# Patient Record
Sex: MALE | Race: WHITE | NOT HISPANIC OR LATINO | URBAN - METROPOLITAN AREA
[De-identification: names, ages, dates, MRNs, and addresses within clinical notes are randomized per-mention and may not be internally consistent; named-entity substitution may affect disease eponyms.]

---

## 2018-04-18 ENCOUNTER — OFFICE VISIT CONVERTED (OUTPATIENT)
Dept: SURGERY | Facility: CLINIC | Age: 68
End: 2018-04-18
Attending: PHYSICIAN ASSISTANT

## 2018-05-09 ENCOUNTER — OFFICE VISIT CONVERTED (OUTPATIENT)
Dept: SURGERY | Facility: CLINIC | Age: 68
End: 2018-05-09
Attending: PHYSICIAN ASSISTANT

## 2019-05-20 ENCOUNTER — OFFICE VISIT CONVERTED (OUTPATIENT)
Dept: UROLOGY | Facility: CLINIC | Age: 69
End: 2019-05-20
Attending: UROLOGY

## 2019-05-20 ENCOUNTER — HOSPITAL ENCOUNTER (OUTPATIENT)
Dept: OTHER | Facility: HOSPITAL | Age: 69
Discharge: HOME | End: 2019-05-20
Attending: UROLOGY

## 2019-05-20 LAB — PSA SERPL-MCNC: 3.05 NG/ML (ref 0–4)

## 2020-05-12 ENCOUNTER — HOSPITAL ENCOUNTER (OUTPATIENT)
Dept: OTHER | Facility: HOSPITAL | Age: 70
Discharge: HOME | End: 2020-05-12
Attending: UROLOGY

## 2020-05-12 LAB — PSA SERPL-MCNC: 2.7 NG/ML (ref 0–4)

## 2020-05-18 ENCOUNTER — TELEPHONE CONVERTED (OUTPATIENT)
Dept: UROLOGY | Facility: CLINIC | Age: 70
End: 2020-05-18
Attending: UROLOGY

## 2021-05-13 NOTE — PROGRESS NOTES
"   Progress Note      Patient Name: Suleman Albert   Patient ID: [id number]   Sex: Male   YOB: 1950    Primary Care Provider: Kostas CARBAJAL   Referring Provider: Kostas CARBAJAL    Visit Date: May 18, 2020    Provider: Jon Mello MD   Location: [facility]   Location Address: [address] [place] [address]  [id number]   Location Phone: [phone]          Chief Complaint     BPH/ED       History Of Present Illness  Suleman Albert is a 69 year old /White male who is presenting for evaluation via telephone call. Verbal consent obtained before beginning visit.   The following staff were present during this visit: Lindy Grayinger      1 yr. Came on slowly.   He does feel like this helps him quite a bit.  He does not miss medication.  Some trouble with initiation of stream. Nocturia X  0.  Some frequency.  No incontinence.   Stable.      PVR    5/19  110    No gross hematuria or UTIs.    No caffeine. Mainly water.    ED ever since 2017.  Patient can get partial erections at times.  He does not have good sensation has a hard time with orgasm.  Patient never really has any ejaculation.    Not interested in Trimix/JEMMA.    Sildenafil -patient had a lot of trouble with sinusitis and cannot tolerate this medication    4/18 total testosterone 310.  Patient does have a history of being on testosterone replacement.  He had been off this for several months as he was having a lot of back issues and was not placed back on this by our PA because his levels were testing normal    Previous    history of nephrolithiasis  Patient has had ESWL  (done three times per patient) and Ureteroscopy with laser lithotripsy with stone extraction (done twice - by Dr. Sebastian)  Last procedure for stone removal - 2002      Patient has also previously been on TRT.  His last injection was in February.  His doctor retired.  HE was getting 600mg IM every month\">15 minutes was used on this telephone " call      68-year-old  gentleman here today for BPH and ED    weak stream.  on Flomax and finasteride for > 1 yr. Came on slowly.   He does feel like this helps him quite a bit.  He does not miss medication.  Some trouble with initiation of stream. Nocturia X  0.  Some frequency.  No incontinence.   Stable.      PVR    5/19  110    No gross hematuria or UTIs.    No caffeine. Mainly water.    ED ever since 2017.  Patient can get partial erections at times.  He does not have good sensation has a hard time with orgasm.  Patient never really has any ejaculation.    Not interested in Trimix/JEMMA.    Sildenafil -patient had a lot of trouble with sinusitis and cannot tolerate this medication    4/18 total testosterone 310.  Patient does have a history of being on testosterone replacement.  He had been off this for several months as he was having a lot of back issues and was not placed back on this by our PA because his levels were testing normal    Previous    history of nephrolithiasis  Patient has had ESWL  (done three times per patient) and Ureteroscopy with laser lithotripsy with stone extraction (done twice - by Dr. Sebastian)  Last procedure for stone removal - 2002      Patient has also previously been on TRT.  His last injection was in February.  His doctor retired.  HE was getting 600mg IM every month      PSA:     5/20        2.7  5/19        3.05  4/18/18:  3.67  12/02/16: 4.52  07/01/16: 3.56  5/31/16:  5.32      Prostate Biospy:  1/09/17:  Mild glandular prostatitis, diffuse and bilateral with glandular hypertrophy; focal basaloid hypertrophy; negative for malignancy    cystoscopy:  11/27/17:  grade 1 trabeculation       Past Medical History  Androgen deficiency; Benign enlargement of prostate; Erectile dysfunction; Fatigue; Frequency of micturition; HTN (hypertension); Hypercholesteremia; Nocturia; Obesity; Prostate cancer screening         Past Surgical History  Back surgery; Colonoscopy; EYE SURGERY          Medication List  cyclobenzaprine 10 mg oral tablet; finasteride 5 mg oral tablet; Fish Oil 1,000 mg (120 mg-180 mg) oral capsule; lisinopril 40 mg oral tablet; pravastatin 80 mg oral tablet; tamsulosin 0.4 mg oral capsule; triamterene-hydrochlorothiazid 37.5-25 mg oral capsule; Vitamin C 500 mg oral tablet; Vitamin D3 2,000 unit oral tablet; Zyrtec 10 mg oral tablet         Allergy List  OxyContin       Allergies Reconciled  Family Medical History  Heart Attack (MI); Lung cancer         Social History  Tobacco (Never)         Review of Systems  · Constitutional  o Denies  o : chills, fever  · Gastrointestinal  o Denies  o : nausea, vomiting              Assessment  · Benign enlargement of prostate     600.00/N40.0  · Erectile dysfunction     607.84/N52.9  · Frequency of micturition     788.41/R35.0  · Nocturia     788.43/R35.1  · Prostate cancer screening     V76.44/Z12.5  · Obesity     278.00/E66.9    Problems Reconciled  Plan  · Orders  o Physician Telephone Evaluation, 11-20 minutes (98183) - 600.00/N40.0, 788.41/R35.0, 788.43/R35.1 - 05/18/2020  · Medications  o Medications have been Reconciled  o Transition of Care or Provider Policy  · Instructions  o Electronically Identified Patient Education Materials Provided Electronically     ED    Not interested in treatment at this time    BPH    Continue Flomax and finasteride.  Refilled for a year.  May be interested in procedures next year.    Prostate cancer screening    Follow-up in 1 year with a PSA and PVR             Electronically Signed by: Jon Mello MD -Author on May 18, 2020 02:19:01 PM

## 2021-05-15 VITALS — BODY MASS INDEX: 42.59 KG/M2 | RESPIRATION RATE: 17 BRPM | HEIGHT: 68 IN | WEIGHT: 281 LBS

## 2021-05-16 VITALS — RESPIRATION RATE: 12 BRPM | HEIGHT: 68 IN | WEIGHT: 271 LBS | BODY MASS INDEX: 41.07 KG/M2

## 2021-05-16 VITALS — WEIGHT: 272.31 LBS | BODY MASS INDEX: 41.27 KG/M2 | HEIGHT: 68 IN

## 2021-05-17 ENCOUNTER — HOSPITAL ENCOUNTER (OUTPATIENT)
Dept: OTHER | Facility: HOSPITAL | Age: 71
Discharge: HOME | End: 2021-05-17
Attending: NURSE PRACTITIONER

## 2021-05-17 LAB — PSA SERPL-MCNC: 2.64 NG/ML (ref 0–4)

## 2021-05-24 ENCOUNTER — OFFICE VISIT CONVERTED (OUTPATIENT)
Dept: UROLOGY | Facility: CLINIC | Age: 71
End: 2021-05-24
Attending: NURSE PRACTITIONER

## 2021-05-24 LAB
BILIRUB UR QL STRIP: NEGATIVE
COLOR UR: YELLOW
CONV BACTERIA IN URINE MICRO: 0
CONV CALCIUM OXALATE CRYSTALS /HPF IN URINE SEDIMENT BY MICROSCOPY: 0
CONV CLARITY OF URINE: CLEAR
CONV PROTEIN IN URINE BY AUTOMATED TEST STRIP: NEGATIVE
CONV UROBILINOGEN IN URINE BY AUTOMATED TEST STRIP: NORMAL
GLUCOSE UR QL: NEGATIVE
HGB UR QL STRIP: NEGATIVE
KETONES UR QL STRIP: NEGATIVE
LEUKOCYTE ESTERASE UR QL STRIP: NEGATIVE
NITRITE UR QL STRIP: NEGATIVE
PH UR STRIP.AUTO: 7 [PH]
RBC #/AREA URNS HPF: 0 /[HPF]
RENAL EPI CELLS #/AREA URNS HPF: 0 /[HPF]
SP GR UR: 1.02
SQUAMOUS SPT QL MICRO: 0
WBC #/AREA URNS HPF: 0 /[HPF]

## 2021-06-05 NOTE — PROGRESS NOTES
Progress Note      Patient Name: Suleman Albert   Patient ID: [id number]   Sex: Male   YOB: 1950    Primary Care Provider: Kostas CARBAJAL   Referring Provider: Kostas CARBAJAL    Visit Date: May 24, 2021    Provider: RAVEN Butcher   Location: [facility] [place]   Location Address: [address] [place], [place] [address]  [address]  [place], [place]  [id number]   Location Phone: [phone]          Chief Complaint     Annual F/U       History Of Present Illness     70-year-old  gentleman returns for annual f/u on BPH and ED    He is currently on finasteride and Flomax and has been on this combo for over 2 years.     He is still with a weak stream.      He denies issues with initiation of stream.     Nocturia X  0-2    Some urge incontinence at times.     Some urgency and frequency as he is drinking a gallon of water a day.      PVR 0    He denies gross hematuria or UTIs.    He has had ED since 2017.  Patient can get partial erections at times. But states issues with partner wanting to have intercourse and is not interested in treatment at this time.      Previous    history of nephrolithiasis  Patient has had ESWL  (done three times per patient) and Ureteroscopy with laser lithotripsy with stone extraction (done twice - by Dr. Sebastian)  Last procedure for stone removal - 2002      Patient has also previously been on TRT.  His last injection was in February.  His doctor retired.  HE was getting 600mg IM every month      PSA:     5/20        2.7  5/19        3.05  4/18/18:  3.67  12/02/16: 4.52  07/01/16: 3.56  5/31/16:  5.32      Prostate Biospy:  1/09/17:  Mild glandular prostatitis, diffuse and bilateral with glandular hypertrophy; focal basaloid hypertrophy; negative for malignancy    cystoscopy:  11/27/17:  grade 1 trabeculation       Past Medical History  Androgen deficiency; Benign enlargement of prostate; Erectile dysfunction; Fatigue; Frequency of micturition; HTN  "(hypertension); Hypercholesteremia; Nocturia; Obesity; Prostate cancer screening         Past Surgical History  Back surgery; Colonoscopy; EYE SURGERY; Kidney Stone Surgery, Unspecified         Medication List  cyclobenzaprine 10 mg oral tablet; finasteride 5 mg oral tablet; hydrocodone-acetaminophen 5-325 mg oral tablet; lisinopril 40 mg oral tablet; pravastatin 80 mg oral tablet; sumatriptan succinate 50 mg oral tablet; tamsulosin 0.4 mg oral capsule; triamterene-hydrochlorothiazid 37.5-25 mg oral capsule; Vitamin D3 2,000 unit oral tablet; Zyrtec 10 mg oral tablet         Allergy List  OxyContin       Allergies Reconciled  Family Medical History  Heart Attack (MI); Lung cancer         Social History  Tobacco (Never)         Review of Systems  · Constitutional  o Denies  o : chills, fever  · Gastrointestinal  o Denies  o : nausea, vomiting  · Genitourinary  o Denies  o : abnormal color of urine, blood in urine, burning with urination, frequency of urine, urgency with urine, urinary leakage  · Integument  o Denies  o : rash      Vitals  Date Time BP Position Site L\R Cuff Size HR RR TEMP (F) WT  HT  BMI kg/m2 BSA m2 O2 Sat FR L/min FiO2        05/24/2021 08:25 AM       12  276lbs 2oz 5'  8\" 41.98 2.45             Physical Examination  · Constitutional  o Appearance  o : well-nourished, well developed, alert, in no acute distress  · Head and Face  o Head  o :   § Inspection  § : atraumatic, normocephalic  o Face  o :   § Inspection  § : no facial lesions  · Eyes  o Sclerae  o : sclerae white  · Ears, Nose, Mouth and Throat  o Ears  o :   § External Ears  § : appearance within normal limits, no lesions present  o Nose  o :   § External Nose  § : appearance normal  · Neck  o Inspection/Palpation  o : normal appearance, trachea midline  · Respiratory  o Respiratory Effort  o : breathing unlabored  o Inspection of Chest  o : normal appearance, no retractions  · Skin and Subcutaneous Tissue  o General Inspection  o : " no rashes or lesions present, no lesions present, no areas of discoloration  · Neurologic  o Mental Status Examination  o :   § Orientation  § : grossly oriented to person, place and time  § Speech/Language  § : communication ability within normal limits  o Gait and Station  o : normal gait, able to stand without difficulty  · Psychiatric  o Judgement and Insight  o : judgment and insight intact, judgement for everyday activities and social situations within normal limits, insight intact  o Mood and Affect  o : mood normal, affect appropriate          Results  · In-Office Procedures  o Surgical procedure  § IOP - Bladder Scan/Residual Urine (88214)   § Specimen vol Ur: 0   o Lab procedure  § Automated dipstick urinalysis with microscopy (20504)   § Color Ur: Yellow   § Clarity Ur: Clear   § Glucose Ur Ql Strip: Negative   § Bilirub Ur Ql Strip: Negative   § Ketones Ur Ql Strip: Negative   § Sp Gr Ur Qn: 1.020   § Hgb Ur Ql Strip: Negative   § pH Ur-LsCnc: 7.0   § Prot Ur Ql Strip: Negative   § Urobilinogen Ur Strip-mCnc: 0.2 E.U./dL   § Nitrite Ur Ql Strip: Negative   § WBC Est Ur Ql Strip: Negative   § RBC UrnS Qn HPF: 0   § WBC UrnS Qn HPF: 0   § Bacteria UrnS Qn HPF: 0   § Crystals UrnS Qn HPF: 0   § Epithelial Cells (non renal): 0 /HPF  § Epithelial Cells (renal): 0       Assessment  · Benign enlargement of prostate     600.00/N40.0  · Erectile dysfunction     607.84/N52.9  · Frequency of micturition     788.41/R35.0  · Nocturia     788.43/R35.1  · Prostate cancer screening     V76.44/Z12.5  · Obesity     278.00/E66.9      Plan  · Orders  o PSA Ultrasensitive, ANNUAL SCREENING [facility] ([address], ) - V76.44/Z12.5 - 05/24/2021  · Medications  o finasteride 5 mg oral tablet   SIG: take 1 tablet (5 mg) by oral route once daily for 90 days   DISP: (90) Tablet with 4 refills  Refilled on 05/24/2021     o tamsulosin 0.4 mg oral capsule   SIG: take 2 capsules (0.8 mg) by oral route once daily 1/2 hour following the same  meal each day for 90 days   DISP: (180) Capsule with 4 refills  Refilled on 05/24/2021     o Medications have been Reconciled  o Transition of Care or Provider Policy     ED    Not interested in treatment at this time    BPH    Continue Flomax and finasteride.  Meds not refilled today he would like to wait until September.  We did discuss procedures today and he will call us for a consult if he wants.      Prostate cancer screening    Follow-up in 1 year with a PSA and PVR             Electronically Signed by: RAVEN Butcher -Author on May 24, 2021 09:00:00 AM

## 2021-07-15 VITALS — RESPIRATION RATE: 12 BRPM | HEIGHT: 68 IN | WEIGHT: 276.12 LBS | BODY MASS INDEX: 41.85 KG/M2

## 2021-09-01 ENCOUNTER — TELEPHONE (OUTPATIENT)
Dept: SURGERY | Facility: CLINIC | Age: 71
End: 2021-09-01

## 2021-09-01 DIAGNOSIS — N40.1 BENIGN PROSTATIC HYPERPLASIA WITH LOWER URINARY TRACT SYMPTOMS, SYMPTOM DETAILS UNSPECIFIED: Primary | ICD-10-CM

## 2021-09-01 RX ORDER — FINASTERIDE 5 MG/1
5 TABLET, FILM COATED ORAL DAILY
Qty: 90 TABLET | Refills: 3 | Status: SHIPPED | OUTPATIENT
Start: 2021-09-01

## 2021-09-01 RX ORDER — TAMSULOSIN HYDROCHLORIDE 0.4 MG/1
2 CAPSULE ORAL DAILY
Qty: 180 CAPSULE | Refills: 3 | Status: SHIPPED | OUTPATIENT
Start: 2021-09-01

## 2021-09-01 NOTE — TELEPHONE ENCOUNTER
Patient called asking for refill on Flomax and Finasteride. Pharmacy is [facility] in [place]. # [phone]

## 2021-09-01 NOTE — TELEPHONE ENCOUNTER
These prescriptions are in [facility], but didn't go through on e-scripts.  I have not called the patient yet.